# Patient Record
Sex: FEMALE | Race: WHITE
[De-identification: names, ages, dates, MRNs, and addresses within clinical notes are randomized per-mention and may not be internally consistent; named-entity substitution may affect disease eponyms.]

---

## 2018-05-17 ENCOUNTER — HOSPITAL ENCOUNTER (EMERGENCY)
Dept: HOSPITAL 26 - MED | Age: 73
LOS: 1 days | Discharge: HOME | End: 2018-05-18
Payer: COMMERCIAL

## 2018-05-17 VITALS — WEIGHT: 195.25 LBS | BODY MASS INDEX: 34.59 KG/M2 | HEIGHT: 63 IN

## 2018-05-17 VITALS — DIASTOLIC BLOOD PRESSURE: 70 MMHG | SYSTOLIC BLOOD PRESSURE: 134 MMHG

## 2018-05-17 DIAGNOSIS — I10: ICD-10-CM

## 2018-05-17 DIAGNOSIS — N39.0: Primary | ICD-10-CM

## 2018-05-17 DIAGNOSIS — E78.00: ICD-10-CM

## 2018-05-17 DIAGNOSIS — Z90.710: ICD-10-CM

## 2018-05-17 LAB
AMORPH SED URNS QL MICRO: (no result) /HPF
APPEARANCE UR: CLEAR
BASOPHILS # BLD AUTO: 0 K/UL (ref 0–0.22)
BASOPHILS NFR BLD AUTO: 0.5 % (ref 0–2)
BILIRUB UR QL STRIP: NEGATIVE
COLOR UR: YELLOW
EOSINOPHIL # BLD AUTO: 0 K/UL (ref 0–0.4)
EOSINOPHIL NFR BLD AUTO: 0.9 % (ref 0–4)
ERYTHROCYTE [DISTWIDTH] IN BLOOD BY AUTOMATED COUNT: 15.1 % (ref 11.6–13.7)
GLUCOSE UR STRIP-MCNC: NEGATIVE MG/DL
HCT VFR BLD AUTO: 38.8 % (ref 36–48)
HGB BLD-MCNC: 12.4 G/DL (ref 12–16)
HGB UR QL STRIP: NEGATIVE
LEUKOCYTE ESTERASE UR QL STRIP: (no result)
LYMPHOCYTES # BLD AUTO: 0.5 K/UL (ref 2.5–16.5)
LYMPHOCYTES NFR BLD AUTO: 15.3 % (ref 20.5–51.1)
MCH RBC QN AUTO: 27 PG (ref 27–31)
MCHC RBC AUTO-ENTMCNC: 32 G/DL (ref 33–37)
MCV RBC AUTO: 84.3 FL (ref 80–94)
MONOCYTES # BLD AUTO: 0.3 K/UL (ref 0.8–1)
MONOCYTES NFR BLD AUTO: 9.2 % (ref 1.7–9.3)
NEUTROPHILS # BLD AUTO: 2.4 K/UL (ref 1.8–7.7)
NEUTROPHILS NFR BLD AUTO: 74.1 % (ref 42.2–75.2)
NITRITE UR QL STRIP: NEGATIVE
PH UR STRIP: 7 [PH] (ref 5–9)
PLATELET # BLD AUTO: 191 K/UL (ref 140–450)
RBC # BLD AUTO: 4.6 MIL/UL (ref 4.2–5.4)
RBC #/AREA URNS HPF: (no result) /HPF (ref 0–5)
WBC # BLD AUTO: 3.2 K/UL (ref 4.8–10.8)
WBC,URINE: (no result) /HPF (ref 0–5)

## 2018-05-17 PROCEDURE — 80053 COMPREHEN METABOLIC PANEL: CPT

## 2018-05-17 PROCEDURE — 83605 ASSAY OF LACTIC ACID: CPT

## 2018-05-17 PROCEDURE — 82150 ASSAY OF AMYLASE: CPT

## 2018-05-17 PROCEDURE — 81001 URINALYSIS AUTO W/SCOPE: CPT

## 2018-05-17 PROCEDURE — 36415 COLL VENOUS BLD VENIPUNCTURE: CPT

## 2018-05-17 PROCEDURE — 96375 TX/PRO/DX INJ NEW DRUG ADDON: CPT

## 2018-05-17 PROCEDURE — 96372 THER/PROPH/DIAG INJ SC/IM: CPT

## 2018-05-17 PROCEDURE — 83690 ASSAY OF LIPASE: CPT

## 2018-05-17 PROCEDURE — 96374 THER/PROPH/DIAG INJ IV PUSH: CPT

## 2018-05-17 PROCEDURE — 99284 EMERGENCY DEPT VISIT MOD MDM: CPT

## 2018-05-17 PROCEDURE — 87040 BLOOD CULTURE FOR BACTERIA: CPT

## 2018-05-17 PROCEDURE — C9113 INJ PANTOPRAZOLE SODIUM, VIA: HCPCS

## 2018-05-17 PROCEDURE — 85025 COMPLETE CBC W/AUTO DIFF WBC: CPT

## 2018-05-17 RX ADMIN — PANTOPRAZOLE SODIUM ONE MG: 40 INJECTION, POWDER, FOR SOLUTION INTRAVENOUS at 23:15

## 2018-05-17 RX ADMIN — PANTOPRAZOLE SODIUM ONE MG: 40 INJECTION, POWDER, FOR SOLUTION INTRAVENOUS at 22:15

## 2018-05-17 NOTE — NUR
72/F CAME IN ED, C/O 10/10 MID ABD PAIN X1 DAY. PT REPORTS NAUSEA, DENIES V/D. 
LBM TODAY. SKIN IS INTACT, PINK/WARM/DRY; AAOX4, PERRL, WITH EVEN AND STEADY 
GAIT; LUNGS CLEAR BL, BREATHING UNLABORED; HR EVEN AND REGULAR, BL PERIPHERAL 
PULSES PRESENT; BS ACTIVE X4, ABD SOFT, ROUND, TENDER ON MID ABD, DENIES 
TENDERNESS ON LOWER QUADRANTS; PT DENIES ANY FEVER, CP, SOB, OR COUGH AT THIS 
TIME; VSS; PATIENT POSITIONED FOR COMFORT; HOB ELEVATED; BEDRAILS UP X2; BED 
DOWN. ER MD DR JAEGER AWARE.

## 2018-05-18 VITALS — SYSTOLIC BLOOD PRESSURE: 148 MMHG | DIASTOLIC BLOOD PRESSURE: 77 MMHG

## 2018-05-18 LAB
ALBUMIN FLD-MCNC: 3.4 G/DL (ref 3.4–5)
AMYLASE SERPL-CCNC: 31 U/L (ref 25–115)
ANION GAP SERPL CALCULATED.3IONS-SCNC: 17.3 MMOL/L (ref 8–16)
AST SERPL-CCNC: 28 U/L (ref 15–37)
BILIRUB SERPL-MCNC: 0.4 MG/DL (ref 0–1)
BUN SERPL-MCNC: 21 MG/DL (ref 7–18)
CHLORIDE SERPL-SCNC: 101 MMOL/L (ref 98–107)
CO2 SERPL-SCNC: 27.3 MMOL/L (ref 21–32)
CREAT SERPL-MCNC: 1 MG/DL (ref 0.6–1.3)
GFR SERPL CREATININE-BSD FRML MDRD: (no result) ML/MIN (ref 90–?)
GLUCOSE SERPL-MCNC: 112 MG/DL (ref 74–106)
LIPASE SERPL-CCNC: 98 U/L (ref 73–393)
POTASSIUM SERPL-SCNC: 3.6 MMOL/L (ref 3.5–5.1)
SODIUM SERPL-SCNC: 142 MMOL/L (ref 136–145)

## 2018-05-18 NOTE — NUR
Patient discharged with v/s stable. Written and verbal after care instructions 
given and explained. Patient alert, oriented and verbalized understanding of 
instructions. Ambulatory with steady gait. All questions addressed prior to 
discharge. ID band removed. Patient advised to follow up with PMD. Rx of 
CEPHALEXIN 500MG given. Patient educated on indication of medication including 
possible reaction and side effects. Opportunity to ask questions provided and 
answered.

## 2018-05-18 NOTE — NUR
PT RESTING COMFORTABLY IN BED, PT REPORTS RELIEF OF PAIN, RR EVEN AND 
UNLABORED, ALL NEEDS MET AT THIS TIME.

## 2018-05-24 ENCOUNTER — HOSPITAL ENCOUNTER (EMERGENCY)
Dept: HOSPITAL 26 - MED | Age: 73
Discharge: HOME | End: 2018-05-24
Payer: COMMERCIAL

## 2018-05-24 VITALS — BODY MASS INDEX: 33.8 KG/M2 | HEIGHT: 64 IN | WEIGHT: 198 LBS

## 2018-05-24 VITALS — SYSTOLIC BLOOD PRESSURE: 142 MMHG | DIASTOLIC BLOOD PRESSURE: 63 MMHG

## 2018-05-24 DIAGNOSIS — E78.5: ICD-10-CM

## 2018-05-24 DIAGNOSIS — I10: ICD-10-CM

## 2018-05-24 DIAGNOSIS — K59.00: Primary | ICD-10-CM

## 2018-05-27 ENCOUNTER — HOSPITAL ENCOUNTER (INPATIENT)
Dept: HOSPITAL 1 - ED | Age: 73
LOS: 3 days | Discharge: HOME HEALTH | DRG: 840 | End: 2018-05-30
Attending: FAMILY MEDICINE | Admitting: FAMILY MEDICINE
Payer: COMMERCIAL

## 2018-05-27 VITALS — HEIGHT: 62.01 IN | WEIGHT: 193.24 LBS | BODY MASS INDEX: 35.11 KG/M2

## 2018-05-27 DIAGNOSIS — C85.93: Primary | ICD-10-CM

## 2018-05-27 DIAGNOSIS — E66.9: ICD-10-CM

## 2018-05-27 DIAGNOSIS — N17.0: ICD-10-CM

## 2018-05-27 DIAGNOSIS — R73.03: ICD-10-CM

## 2018-05-27 DIAGNOSIS — R16.2: ICD-10-CM

## 2018-05-27 DIAGNOSIS — D64.9: ICD-10-CM

## 2018-05-27 DIAGNOSIS — E78.5: ICD-10-CM

## 2018-05-27 DIAGNOSIS — D72.819: ICD-10-CM

## 2018-05-27 DIAGNOSIS — M19.90: ICD-10-CM

## 2018-05-27 DIAGNOSIS — K21.9: ICD-10-CM

## 2018-05-27 DIAGNOSIS — R74.0: ICD-10-CM

## 2018-05-27 DIAGNOSIS — I10: ICD-10-CM

## 2018-05-27 DIAGNOSIS — E44.0: ICD-10-CM

## 2018-05-27 LAB
ALBUMIN SERPL-MCNC: 3.1 G/DL (ref 3.4–5)
ALP SERPL-CCNC: 116 U/L (ref 46–116)
ALT SERPL-CCNC: 33 U/L (ref 14–59)
AMYLASE SERPL-CCNC: 40 U/L (ref 25–115)
AST SERPL-CCNC: 68 U/L (ref 15–37)
BASOPHILS NFR BLD: 0.3 % (ref 0–2)
BILIRUB SERPL-MCNC: 0.3 MG/DL (ref 0.2–1)
BUN SERPL-MCNC: 18 MG/DL (ref 7–18)
CALCIUM SERPL-MCNC: 9.3 MG/DL (ref 8.5–10.1)
CHLORIDE SERPL-SCNC: 101 MMOL/L (ref 98–107)
CO2 SERPL-SCNC: 27.8 MMOL/L (ref 21–32)
CREAT SERPL-MCNC: 0.9 MG/DL (ref 0.6–1)
ERYTHROCYTE [DISTWIDTH] IN BLOOD BY AUTOMATED COUNT: 15.8 % (ref 11.5–14.5)
GFR SERPLBLD BASED ON 1.73 SQ M-ARVRAT: (no result) ML/MIN
GLUCOSE SERPL-MCNC: 107 MG/DL (ref 74–106)
LIPASE SERPL-CCNC: 140 IU/L (ref 73–393)
PLATELET # BLD: 248 X10^3MCL (ref 130–400)
POTASSIUM SERPL-SCNC: 3.8 MMOL/L (ref 3.5–5.1)
PROT SERPL-MCNC: 7 G/DL (ref 6.4–8.2)
SODIUM SERPL-SCNC: 139 MMOL/L (ref 136–145)

## 2018-05-27 PROCEDURE — C1894 INTRO/SHEATH, NON-LASER: HCPCS

## 2018-05-28 VITALS — DIASTOLIC BLOOD PRESSURE: 53 MMHG | SYSTOLIC BLOOD PRESSURE: 127 MMHG

## 2018-05-28 VITALS — SYSTOLIC BLOOD PRESSURE: 150 MMHG | DIASTOLIC BLOOD PRESSURE: 67 MMHG

## 2018-05-28 VITALS — SYSTOLIC BLOOD PRESSURE: 136 MMHG | DIASTOLIC BLOOD PRESSURE: 68 MMHG

## 2018-05-28 VITALS — SYSTOLIC BLOOD PRESSURE: 136 MMHG | DIASTOLIC BLOOD PRESSURE: 62 MMHG

## 2018-05-28 VITALS — SYSTOLIC BLOOD PRESSURE: 110 MMHG | DIASTOLIC BLOOD PRESSURE: 41 MMHG

## 2018-05-28 VITALS — SYSTOLIC BLOOD PRESSURE: 118 MMHG | DIASTOLIC BLOOD PRESSURE: 62 MMHG

## 2018-05-28 VITALS — DIASTOLIC BLOOD PRESSURE: 73 MMHG | SYSTOLIC BLOOD PRESSURE: 140 MMHG

## 2018-05-28 LAB
BASOPHILS NFR BLD: 0.4 % (ref 0–2)
BUN SERPL-MCNC: 16 MG/DL (ref 7–18)
CALCIUM SERPL-MCNC: 9.4 MG/DL (ref 8.5–10.1)
CHLORIDE SERPL-SCNC: 104 MMOL/L (ref 98–107)
CHOLEST SERPL-MCNC: 158 MG/DL (ref ?–200)
CHOLEST/HDLC SERPL: 5.4 MG/DL
CO2 SERPL-SCNC: 26.9 MMOL/L (ref 21–32)
CREAT SERPL-MCNC: 0.7 MG/DL (ref 0.6–1)
ERYTHROCYTE [DISTWIDTH] IN BLOOD BY AUTOMATED COUNT: 15.7 % (ref 11.5–14.5)
GFR SERPLBLD BASED ON 1.73 SQ M-ARVRAT: (no result) ML/MIN
GLUCOSE SERPL-MCNC: 96 MG/DL (ref 74–106)
HDLC SERPL-MCNC: 29 MG/DL (ref 40–60)
MAGNESIUM SERPL-MCNC: 1.9 MG/DL (ref 1.8–2.4)
MICROSCOPIC UR-IMP: NO
PHOSPHATE SERPL-MCNC: 4.6 MG/DL (ref 2.5–4.9)
PLATELET # BLD: 234 X10^3MCL (ref 130–400)
POTASSIUM SERPL-SCNC: 3.7 MMOL/L (ref 3.5–5.1)
RBC # UR STRIP.AUTO: NEGATIVE /UL
SODIUM SERPL-SCNC: 141 MMOL/L (ref 136–145)
T3 SERPL-MCNC: 1.01 NG/ML
T3RU NFR SERPL: 33 % UPTAKE (ref 30–39)
T4 FREE SERPL-MCNC: 1.29 NG/DL (ref 0.76–1.46)
T4 SERPL-MCNC: 10.3 UG/DL (ref 4.7–13.3)
T4/T3 UPTAKE INDEX SERPL: 3.4 UG/DL (ref 1.4–4.5)
TRIGL SERPL-MCNC: 124 MG/DL (ref ?–150)
UA SPECIFIC GRAVITY: 1.01 (ref 1–1.03)

## 2018-05-29 VITALS — SYSTOLIC BLOOD PRESSURE: 129 MMHG | DIASTOLIC BLOOD PRESSURE: 53 MMHG

## 2018-05-29 VITALS — DIASTOLIC BLOOD PRESSURE: 68 MMHG | SYSTOLIC BLOOD PRESSURE: 138 MMHG

## 2018-05-29 VITALS — SYSTOLIC BLOOD PRESSURE: 124 MMHG | DIASTOLIC BLOOD PRESSURE: 57 MMHG

## 2018-05-29 VITALS — DIASTOLIC BLOOD PRESSURE: 54 MMHG | SYSTOLIC BLOOD PRESSURE: 135 MMHG

## 2018-05-29 VITALS — DIASTOLIC BLOOD PRESSURE: 67 MMHG | SYSTOLIC BLOOD PRESSURE: 137 MMHG

## 2018-05-29 VITALS — DIASTOLIC BLOOD PRESSURE: 67 MMHG | SYSTOLIC BLOOD PRESSURE: 136 MMHG

## 2018-05-29 VITALS — SYSTOLIC BLOOD PRESSURE: 141 MMHG | DIASTOLIC BLOOD PRESSURE: 53 MMHG

## 2018-05-29 VITALS — DIASTOLIC BLOOD PRESSURE: 60 MMHG | SYSTOLIC BLOOD PRESSURE: 137 MMHG

## 2018-05-29 VITALS — SYSTOLIC BLOOD PRESSURE: 133 MMHG | DIASTOLIC BLOOD PRESSURE: 66 MMHG

## 2018-05-29 VITALS — SYSTOLIC BLOOD PRESSURE: 134 MMHG | DIASTOLIC BLOOD PRESSURE: 62 MMHG

## 2018-05-29 LAB
BASOPHILS NFR BLD: 0.4 % (ref 0–2)
BUN SERPL-MCNC: 14 MG/DL (ref 7–18)
CALCIUM SERPL-MCNC: 9.1 MG/DL (ref 8.5–10.1)
CHLORIDE SERPL-SCNC: 108 MMOL/L (ref 98–107)
CO2 SERPL-SCNC: 28.8 MMOL/L (ref 21–32)
CREAT SERPL-MCNC: 0.6 MG/DL (ref 0.6–1)
ERYTHROCYTE [DISTWIDTH] IN BLOOD BY AUTOMATED COUNT: 15.2 % (ref 11.5–14.5)
GFR SERPLBLD BASED ON 1.73 SQ M-ARVRAT: (no result) ML/MIN
GLUCOSE SERPL-MCNC: 86 MG/DL (ref 74–106)
IRON SERPL-MCNC: 34 UG/DL (ref 50–170)
MAGNESIUM SERPL-MCNC: 2.1 MG/DL (ref 1.8–2.4)
PHOSPHATE SERPL-MCNC: 4.1 MG/DL (ref 2.5–4.9)
PLATELET # BLD: 239 X10^3MCL (ref 130–400)
POTASSIUM SERPL-SCNC: 4.3 MMOL/L (ref 3.5–5.1)
RBC # BLD AUTO: 4.13 M/MM3 (ref 4.1–5.1)
SODIUM SERPL-SCNC: 145 MMOL/L (ref 136–145)
TIBC SERPL-MCNC: 243 UG/DL (ref 250–450)

## 2018-05-29 PROCEDURE — 07DB3ZX EXTRACTION OF MESENTERIC LYMPHATIC, PERCUTANEOUS APPROACH, DIAGNOSTIC: ICD-10-PCS | Performed by: RADIOLOGY

## 2018-05-30 VITALS — DIASTOLIC BLOOD PRESSURE: 60 MMHG | SYSTOLIC BLOOD PRESSURE: 113 MMHG

## 2018-05-30 VITALS — SYSTOLIC BLOOD PRESSURE: 114 MMHG | DIASTOLIC BLOOD PRESSURE: 71 MMHG

## 2018-05-30 VITALS — DIASTOLIC BLOOD PRESSURE: 71 MMHG | SYSTOLIC BLOOD PRESSURE: 114 MMHG

## 2018-05-30 LAB
BASOPHILS NFR BLD: 0.2 % (ref 0–2)
BUN SERPL-MCNC: 13 MG/DL (ref 7–18)
CALCIUM SERPL-MCNC: 9.1 MG/DL (ref 8.5–10.1)
CHLORIDE SERPL-SCNC: 108 MMOL/L (ref 98–107)
CO2 SERPL-SCNC: 26.1 MMOL/L (ref 21–32)
CREAT SERPL-MCNC: 0.6 MG/DL (ref 0.6–1)
ERYTHROCYTE [DISTWIDTH] IN BLOOD BY AUTOMATED COUNT: 15.4 % (ref 11.5–14.5)
GFR SERPLBLD BASED ON 1.73 SQ M-ARVRAT: (no result) ML/MIN
GLUCOSE SERPL-MCNC: 97 MG/DL (ref 74–106)
MAGNESIUM SERPL-MCNC: 2 MG/DL (ref 1.8–2.4)
PHOSPHATE SERPL-MCNC: 4.4 MG/DL (ref 2.5–4.9)
PLATELET # BLD: 249 X10^3MCL (ref 130–400)
POTASSIUM SERPL-SCNC: 4.2 MMOL/L (ref 3.5–5.1)
SODIUM SERPL-SCNC: 145 MMOL/L (ref 136–145)

## 2022-06-23 ENCOUNTER — HOSPITAL ENCOUNTER (EMERGENCY)
Dept: HOSPITAL 26 - MED | Age: 77
Discharge: HOME | End: 2022-06-23
Payer: COMMERCIAL

## 2022-06-23 VITALS — SYSTOLIC BLOOD PRESSURE: 151 MMHG | DIASTOLIC BLOOD PRESSURE: 83 MMHG

## 2022-06-23 VITALS — BODY MASS INDEX: 34.78 KG/M2 | HEIGHT: 62 IN | WEIGHT: 189 LBS

## 2022-06-23 VITALS — DIASTOLIC BLOOD PRESSURE: 78 MMHG | SYSTOLIC BLOOD PRESSURE: 120 MMHG

## 2022-06-23 DIAGNOSIS — I10: ICD-10-CM

## 2022-06-23 DIAGNOSIS — K52.9: Primary | ICD-10-CM

## 2022-06-23 LAB
ALBUMIN FLD-MCNC: 3.8 G/DL (ref 3.4–5)
ANION GAP SERPL CALCULATED.3IONS-SCNC: 12 MMOL/L (ref 8–16)
AST SERPL-CCNC: 23 U/L (ref 15–37)
BASOPHILS # BLD AUTO: 0 K/UL (ref 0–0.22)
BASOPHILS NFR BLD AUTO: 0.3 % (ref 0–2)
BILIRUB SERPL-MCNC: 0.6 MG/DL (ref 0–1)
BUN SERPL-MCNC: 20 MG/DL (ref 7–18)
CHLORIDE SERPL-SCNC: 105 MMOL/L (ref 98–107)
CO2 SERPL-SCNC: 27 MMOL/L (ref 21–32)
CREAT SERPL-MCNC: 0.8 MG/DL (ref 0.6–1.3)
EOSINOPHIL # BLD AUTO: 0 K/UL (ref 0–0.4)
EOSINOPHIL NFR BLD AUTO: 0.1 % (ref 0–4)
ERYTHROCYTE [DISTWIDTH] IN BLOOD BY AUTOMATED COUNT: 15.5 % (ref 11.6–13.7)
GFR SERPL CREATININE-BSD FRML MDRD: (no result) ML/MIN (ref 90–?)
GLUCOSE SERPL-MCNC: 138 MG/DL (ref 74–106)
HCT VFR BLD AUTO: 37.5 % (ref 36–48)
HGB BLD-MCNC: 12.8 G/DL (ref 12–16)
LYMPHOCYTES # BLD AUTO: 0.6 K/UL (ref 2.5–16.5)
LYMPHOCYTES NFR BLD AUTO: 16.4 % (ref 20.5–51.1)
MCH RBC QN AUTO: 30 PG (ref 27–31)
MCHC RBC AUTO-ENTMCNC: 34 G/DL (ref 33–37)
MCV RBC AUTO: 87.3 FL (ref 80–94)
MONOCYTES # BLD AUTO: 0.3 K/UL (ref 0.8–1)
MONOCYTES NFR BLD AUTO: 8.8 % (ref 1.7–9.3)
NEUTROPHILS # BLD AUTO: 2.8 K/UL (ref 1.8–7.7)
NEUTROPHILS NFR BLD AUTO: 74.4 % (ref 42.2–75.2)
PLATELET # BLD AUTO: 143 K/UL (ref 140–450)
POTASSIUM SERPL-SCNC: 3 MMOL/L (ref 3.5–5.1)
RBC # BLD AUTO: 4.3 MIL/UL (ref 4.2–5.4)
SODIUM SERPL-SCNC: 141 MMOL/L (ref 136–145)
WBC # BLD AUTO: 3.8 K/UL (ref 4.8–10.8)

## 2022-06-23 PROCEDURE — 99283 EMERGENCY DEPT VISIT LOW MDM: CPT

## 2022-06-23 PROCEDURE — 81002 URINALYSIS NONAUTO W/O SCOPE: CPT

## 2022-06-23 PROCEDURE — 85025 COMPLETE CBC W/AUTO DIFF WBC: CPT

## 2022-06-23 PROCEDURE — 80053 COMPREHEN METABOLIC PANEL: CPT

## 2022-06-23 PROCEDURE — 96360 HYDRATION IV INFUSION INIT: CPT

## 2022-06-23 PROCEDURE — 36415 COLL VENOUS BLD VENIPUNCTURE: CPT

## 2022-06-23 NOTE — NUR
Patient discharged with v/s stable. Written and verbal after care instructions 
given and explained. 

Patient verbalized understanding. Ambulatory with steady gait. IV DISCONTINUED 
All questions addressed prior to discharge. Advised to follow up with PMD.

## 2022-06-23 NOTE — NUR
75 Y/O FEMALE [facility] C/O VOMITING/DIARRHEA X TODAY. PT STATES THIS 
OCCURED AFTER EATING A HAMBURGER LAST NIGHT. PT DENIES FEVER,CHILLS. PT AAOX4. 
ABLE TO MAKE NEEDS KNOWN. DENIES PAIN AT THIS TIME. 



PMH:HTN, HDL, ARTHRITIS

NKA

## 2022-07-12 ENCOUNTER — HOSPITAL ENCOUNTER (EMERGENCY)
Dept: HOSPITAL 26 - MED | Age: 77
LOS: 1 days | Discharge: HOME | End: 2022-07-13
Payer: COMMERCIAL

## 2022-07-12 VITALS
BODY MASS INDEX: 23.04 KG/M2 | WEIGHT: 130 LBS | SYSTOLIC BLOOD PRESSURE: 154 MMHG | HEIGHT: 63 IN | DIASTOLIC BLOOD PRESSURE: 82 MMHG

## 2022-07-12 DIAGNOSIS — Z90.710: ICD-10-CM

## 2022-07-12 DIAGNOSIS — R11.2: Primary | ICD-10-CM

## 2022-07-12 DIAGNOSIS — I10: ICD-10-CM

## 2022-07-12 DIAGNOSIS — R19.7: ICD-10-CM

## 2022-07-12 PROCEDURE — 82009 KETONE BODYS QUAL: CPT

## 2022-07-12 PROCEDURE — 36415 COLL VENOUS BLD VENIPUNCTURE: CPT

## 2022-07-12 PROCEDURE — 80053 COMPREHEN METABOLIC PANEL: CPT

## 2022-07-12 PROCEDURE — 99283 EMERGENCY DEPT VISIT LOW MDM: CPT

## 2022-07-12 PROCEDURE — 85025 COMPLETE CBC W/AUTO DIFF WBC: CPT

## 2022-07-13 VITALS — DIASTOLIC BLOOD PRESSURE: 80 MMHG | SYSTOLIC BLOOD PRESSURE: 121 MMHG

## 2022-07-13 LAB
ALBUMIN FLD-MCNC: 4.1 G/DL (ref 3.4–5)
ANION GAP SERPL CALCULATED.3IONS-SCNC: 19.2 MMOL/L (ref 8–16)
AST SERPL-CCNC: 26 U/L (ref 15–37)
BASOPHILS # BLD AUTO: 0 K/UL (ref 0–0.22)
BASOPHILS NFR BLD AUTO: 0.2 % (ref 0–2)
BILIRUB SERPL-MCNC: 0.5 MG/DL (ref 0–1)
BUN SERPL-MCNC: 21 MG/DL (ref 7–18)
CHLORIDE SERPL-SCNC: 107 MMOL/L (ref 98–107)
CO2 SERPL-SCNC: 22.5 MMOL/L (ref 21–32)
CREAT SERPL-MCNC: 1.4 MG/DL (ref 0.6–1.3)
EOSINOPHIL # BLD AUTO: 0 K/UL (ref 0–0.4)
EOSINOPHIL NFR BLD AUTO: 0.8 % (ref 0–4)
ERYTHROCYTE [DISTWIDTH] IN BLOOD BY AUTOMATED COUNT: 15.9 % (ref 11.6–13.7)
GFR SERPL CREATININE-BSD FRML MDRD: (no result) ML/MIN (ref 90–?)
GLUCOSE SERPL-MCNC: 224 MG/DL (ref 74–106)
HCT VFR BLD AUTO: 42.6 % (ref 36–48)
HGB BLD-MCNC: 14.3 G/DL (ref 12–16)
LYMPHOCYTES # BLD AUTO: 0.2 K/UL (ref 2.5–16.5)
LYMPHOCYTES NFR BLD AUTO: 6.5 % (ref 20.5–51.1)
MCH RBC QN AUTO: 30 PG (ref 27–31)
MCHC RBC AUTO-ENTMCNC: 34 G/DL (ref 33–37)
MCV RBC AUTO: 89.1 FL (ref 80–94)
MONOCYTES # BLD AUTO: 0.3 K/UL (ref 0.8–1)
MONOCYTES NFR BLD AUTO: 8.1 % (ref 1.7–9.3)
NEUTROPHILS # BLD AUTO: 3.1 K/UL (ref 1.8–7.7)
NEUTROPHILS NFR BLD AUTO: 84.4 % (ref 42.2–75.2)
PLATELET # BLD AUTO: 161 K/UL (ref 140–450)
POTASSIUM SERPL-SCNC: 3.7 MMOL/L (ref 3.5–5.1)
RBC # BLD AUTO: 4.78 MIL/UL (ref 4.2–5.4)
SODIUM SERPL-SCNC: 145 MMOL/L (ref 136–145)
WBC # BLD AUTO: 3.6 K/UL (ref 4.8–10.8)

## 2022-07-13 NOTE — NUR
Patient discharged with v/s stable. Written and verbal after care instructions 
given and explained. 

Patient alert, oriented and verbalized understanding of instructions. 
Ambulatory with steady gait. All questions addressed prior to discharge. ID 
band removed. Patient advised to follow up with PMD. Rx of ZOFRAN ODT given. 
Patient educated on indication of medication including possible reaction and 
side effects. Opportunity to ask questions provided and answered.

## 2022-08-21 ENCOUNTER — HOSPITAL ENCOUNTER (EMERGENCY)
Dept: HOSPITAL 26 - MED | Age: 77
Discharge: HOME | End: 2022-08-21
Payer: COMMERCIAL

## 2022-08-21 VITALS — DIASTOLIC BLOOD PRESSURE: 78 MMHG | SYSTOLIC BLOOD PRESSURE: 159 MMHG

## 2022-08-21 VITALS — DIASTOLIC BLOOD PRESSURE: 68 MMHG | SYSTOLIC BLOOD PRESSURE: 134 MMHG

## 2022-08-21 VITALS — BODY MASS INDEX: 35.78 KG/M2 | HEIGHT: 61 IN | WEIGHT: 189.5 LBS

## 2022-08-21 VITALS — SYSTOLIC BLOOD PRESSURE: 118 MMHG | DIASTOLIC BLOOD PRESSURE: 70 MMHG

## 2022-08-21 DIAGNOSIS — Z79.1: ICD-10-CM

## 2022-08-21 DIAGNOSIS — I10: ICD-10-CM

## 2022-08-21 DIAGNOSIS — L03.113: Primary | ICD-10-CM

## 2022-08-21 DIAGNOSIS — Z79.899: ICD-10-CM

## 2022-08-21 NOTE — NUR
-------------------------------------------------------------------------------

            *** Note undone in [facility] - 08/21/22 at 1300 by MED1 ***            

-------------------------------------------------------------------------------

DAVID

## 2022-08-21 NOTE — NUR
Patient discharged with v/s stable. Written and verbal after care instructions 
given and explained. 

Patient alert, oriented and verbalized understanding of instructions. 
Ambulatory with steady gait. All questions addressed prior to discharge. ID 
band removed. Patient advised to follow up with PMD. Rx of VIBRAMYCIN&TYLENOL 
given. Patient educated on indication of medication including possible reaction 
and side effects. Opportunity to ask questions provided and answered. Unresponsive unresponsive

## 2023-08-10 ENCOUNTER — HOSPITAL ENCOUNTER (EMERGENCY)
Dept: HOSPITAL 26 - MED | Age: 78
Discharge: ANOTHER HOSPITAL | End: 2023-08-10
Payer: MEDICARE

## 2023-08-10 VITALS
RESPIRATION RATE: 16 BRPM | DIASTOLIC BLOOD PRESSURE: 79 MMHG | HEART RATE: 70 BPM | SYSTOLIC BLOOD PRESSURE: 154 MMHG | OXYGEN SATURATION: 99 % | TEMPERATURE: 96.7 F

## 2023-08-10 VITALS
TEMPERATURE: 98.9 F | RESPIRATION RATE: 16 BRPM | OXYGEN SATURATION: 99 % | DIASTOLIC BLOOD PRESSURE: 98 MMHG | SYSTOLIC BLOOD PRESSURE: 117 MMHG | HEART RATE: 54 BPM

## 2023-08-10 VITALS — OXYGEN SATURATION: 99 %

## 2023-08-10 VITALS — WEIGHT: 171.37 LBS | BODY MASS INDEX: 30.36 KG/M2 | HEIGHT: 63 IN

## 2023-08-10 DIAGNOSIS — I10: ICD-10-CM

## 2023-08-10 DIAGNOSIS — E87.1: ICD-10-CM

## 2023-08-10 DIAGNOSIS — R42: ICD-10-CM

## 2023-08-10 DIAGNOSIS — S09.90XA: ICD-10-CM

## 2023-08-10 DIAGNOSIS — M25.572: ICD-10-CM
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
## 2023-08-10 DIAGNOSIS — N89.8: ICD-10-CM

## 2023-08-10 DIAGNOSIS — B37.31: ICD-10-CM
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
## 2023-08-10 DIAGNOSIS — E86.0: ICD-10-CM

## 2023-08-10 DIAGNOSIS — S80.12XA: Primary | ICD-10-CM

## 2023-08-10 DIAGNOSIS — Z79.899: ICD-10-CM

## 2023-08-10 DIAGNOSIS — E11.65: ICD-10-CM

## 2023-08-10 DIAGNOSIS — Z20.822: ICD-10-CM

## 2023-08-10 DIAGNOSIS — D72.819: ICD-10-CM
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
## 2023-08-10 DIAGNOSIS — Z90.710: ICD-10-CM

## 2023-08-10 DIAGNOSIS — D69.6: ICD-10-CM

## 2023-08-10 LAB
ALBUMIN FLD-MCNC: 3 G/DL (ref 3.4–5)
ALP SERPL-CCNC: 71 U/L (ref 50–136)
ALT SERPL-CCNC: 41 U/L (ref 12–78)
ANION GAP SERPL CALCULATED.3IONS-SCNC: 12.8 MMOL/L (ref 8–16)
APPEARANCE UR: CLEAR
AST SERPL-CCNC: 24 U/L (ref 15–37)
BASOPHILS # BLD AUTO: 0 K/UL (ref 0–0.22)
BASOPHILS NFR BLD AUTO: 0.6 % (ref 0–2)
BILIRUB SERPL-MCNC: 0.8 MG/DL (ref 0–1)
BILIRUB UR QL STRIP: NEGATIVE
BUN SERPL-MCNC: 28 MG/DL (ref 7–18)
CALCIUM SPEC-MCNC: 8.6 MG/DL (ref 8.5–10.1)
CHLORIDE SERPL-SCNC: 94 MMOL/L (ref 98–107)
CO2 SERPL-SCNC: 29.6 MMOL/L (ref 21–32)
COLOR UR: YELLOW
CREAT SERPL-MCNC: 1.1 MG/DL (ref 0.6–1.3)
EOSINOPHIL # BLD AUTO: 0 K/UL (ref 0–0.4)
EOSINOPHIL NFR BLD AUTO: 0.2 % (ref 0–4)
ERYTHROCYTE [DISTWIDTH] IN BLOOD BY AUTOMATED COUNT: 15 % (ref 11.6–13.7)
GFR SERPL CREATININE-BSD FRML MDRD: (no result) ML/MIN (ref 90–?)
GLUCOSE SERPL-MCNC: 614 MG/DL (ref 74–106)
GLUCOSE UR STRIP-MCNC: (no result) MG/DL
HCT VFR BLD AUTO: 38.7 % (ref 36–48)
HGB BLD-MCNC: 13 G/DL (ref 12–16)
HGB UR QL STRIP: NEGATIVE
KETONES TITR SERPL: NEGATIVE {TITER}
LEUKOCYTE ESTERASE UR QL STRIP: NEGATIVE
LYMPHOCYTES # BLD AUTO: 1 K/UL (ref 2.5–16.5)
LYMPHOCYTES NFR BLD AUTO: 32.2 % (ref 20.5–51.1)
MCH RBC QN AUTO: 30 PG (ref 27–31)
MCHC RBC AUTO-ENTMCNC: 34 G/DL (ref 33–37)
MCV RBC AUTO: 89.2 FL (ref 80–94)
MONOCYTES # BLD AUTO: 0.2 K/UL (ref 0.8–1)
MONOCYTES NFR BLD AUTO: 7.5 % (ref 1.7–9.3)
NEUTROPHILS # BLD AUTO: 1.8 K/UL (ref 1.8–7.7)
NEUTROPHILS NFR BLD AUTO: 59.5 % (ref 42.2–75.2)
NITRITE UR QL STRIP: NEGATIVE
PH UR STRIP: 7 [PH] (ref 5–9)
PLATELET # BLD AUTO: 127 K/UL (ref 140–450)
POTASSIUM SERPL-SCNC: 4.4 MMOL/L (ref 3.5–5.1)
PROT SERPL-MCNC: 5.9 G/DL (ref 6.4–8.2)
PROT UR QL STRIP: NEGATIVE
RBC # BLD AUTO: 4.33 MIL/UL (ref 4.2–5.4)
SODIUM SERPL-SCNC: 132 MMOL/L (ref 136–145)
SP GR UR STRIP: 1.01 (ref 1–1.03)
UROBILINOGEN UR STRIP-MCNC: 0.2 EU/DL (ref 0.2–1)
WBC # BLD AUTO: 3.1 K/UL (ref 4.8–10.8)

## 2023-08-10 PROCEDURE — 82803 BLOOD GASES ANY COMBINATION: CPT

## 2023-08-10 PROCEDURE — 85025 COMPLETE CBC W/AUTO DIFF WBC: CPT

## 2023-08-10 PROCEDURE — 83880 ASSAY OF NATRIURETIC PEPTIDE: CPT

## 2023-08-10 PROCEDURE — 80053 COMPREHEN METABOLIC PANEL: CPT

## 2023-08-10 PROCEDURE — 99291 CRITICAL CARE FIRST HOUR: CPT

## 2023-08-10 PROCEDURE — 96361 HYDRATE IV INFUSION ADD-ON: CPT

## 2023-08-10 PROCEDURE — 96372 THER/PROPH/DIAG INJ SC/IM: CPT

## 2023-08-10 PROCEDURE — 93005 ELECTROCARDIOGRAM TRACING: CPT

## 2023-08-10 PROCEDURE — 73590 X-RAY EXAM OF LOWER LEG: CPT

## 2023-08-10 PROCEDURE — 82009 KETONE BODYS QUAL: CPT

## 2023-08-10 PROCEDURE — 83935 ASSAY OF URINE OSMOLALITY: CPT

## 2023-08-10 PROCEDURE — 73630 X-RAY EXAM OF FOOT: CPT

## 2023-08-10 PROCEDURE — 87426 SARSCOV CORONAVIRUS AG IA: CPT

## 2023-08-10 PROCEDURE — 87040 BLOOD CULTURE FOR BACTERIA: CPT

## 2023-08-10 PROCEDURE — 96360 HYDRATION IV INFUSION INIT: CPT

## 2023-08-10 PROCEDURE — 84484 ASSAY OF TROPONIN QUANT: CPT

## 2023-08-10 PROCEDURE — 73610 X-RAY EXAM OF ANKLE: CPT

## 2023-08-10 PROCEDURE — 70450 CT HEAD/BRAIN W/O DYE: CPT

## 2023-08-10 PROCEDURE — 81003 URINALYSIS AUTO W/O SCOPE: CPT

## 2023-08-10 PROCEDURE — 82948 REAGENT STRIP/BLOOD GLUCOSE: CPT

## 2023-08-10 PROCEDURE — 83605 ASSAY OF LACTIC ACID: CPT

## 2023-08-10 PROCEDURE — 87210 SMEAR WET MOUNT SALINE/INK: CPT

## 2023-08-10 PROCEDURE — 36415 COLL VENOUS BLD VENIPUNCTURE: CPT

## 2023-08-10 PROCEDURE — 71045 X-RAY EXAM CHEST 1 VIEW: CPT
